# Patient Record
Sex: FEMALE
[De-identification: names, ages, dates, MRNs, and addresses within clinical notes are randomized per-mention and may not be internally consistent; named-entity substitution may affect disease eponyms.]

---

## 2018-04-16 ENCOUNTER — RX ONLY (OUTPATIENT)
Age: 48
Setting detail: RX ONLY
End: 2018-04-16

## 2018-04-16 RX ORDER — VALACYCLOVIR HYDROCHLORIDE 500 MG/1
TABLET, FILM COATED ORAL
Qty: 30 | Refills: 2 | Status: ERX | COMMUNITY
Start: 2018-04-16

## 2019-08-26 ENCOUNTER — RX ONLY (OUTPATIENT)
Age: 49
Setting detail: RX ONLY
End: 2019-08-26

## 2019-08-26 RX ORDER — CIPROFLOXACIN HYDROCHLORIDE 500 MG/1
TABLET, FILM COATED ORAL
Qty: 14 | Refills: 0

## 2019-08-26 RX ORDER — CIPROFLOXACIN HYDROCHLORIDE 500 MG/1
TABLET, FILM COATED ORAL
Qty: 14 | Refills: 0 | Status: ERX | COMMUNITY
Start: 2019-08-26

## 2019-08-26 RX ORDER — CIPROFLOXACIN HYDROCHLORIDE 500 MG/1
TABLET, FILM COATED ORAL
Qty: 14 | Refills: 0 | Status: ERX

## 2020-06-09 ENCOUNTER — NEW PATIENT EMERGENCY (OUTPATIENT)
Dept: URBAN - METROPOLITAN AREA CLINIC 39 | Facility: CLINIC | Age: 50
End: 2020-06-09

## 2020-06-09 DIAGNOSIS — H10.32: ICD-10-CM

## 2020-06-09 PROCEDURE — 99203 OFFICE O/P NEW LOW 30 MIN: CPT

## 2020-06-09 ASSESSMENT — VISUAL ACUITY
OD_SC: 20/400
OD_PH: 20/70
OS_SC: 20/400

## 2020-06-09 ASSESSMENT — TONOMETRY: OD_IOP_MMHG: 16

## 2020-12-03 ENCOUNTER — RX ONLY (OUTPATIENT)
Age: 50
Setting detail: RX ONLY
End: 2020-12-03

## 2020-12-03 RX ORDER — LISINOPRIL 40 MG/1
TABLET ORAL QD
Qty: 30 | Refills: 11 | Status: ERX

## 2020-12-03 RX ORDER — LISINOPRIL 40 MG/1
1 TABLET ORAL QD
Qty: 30 | Refills: 11 | Status: ERX | COMMUNITY
Start: 2020-12-03

## 2020-12-10 ENCOUNTER — RX ONLY (OUTPATIENT)
Age: 50
Setting detail: RX ONLY
End: 2020-12-10

## 2020-12-10 RX ORDER — SPIRONOLACTONE 50 MG/1
50 MG TABLET, FILM COATED ORAL QD
Qty: 30 | Refills: 0 | Status: ERX | COMMUNITY
Start: 2020-12-10

## 2020-12-17 ENCOUNTER — RX ONLY (OUTPATIENT)
Age: 50
Setting detail: RX ONLY
End: 2020-12-17

## 2020-12-17 RX ORDER — AZITHROMYCIN MONOHYDRATE 250 MG/1
1 TABLET, FILM COATED ORAL AS DIRECTED
Qty: 7 | Refills: 0 | Status: ERX | COMMUNITY
Start: 2020-12-17

## 2021-02-09 ENCOUNTER — RX ONLY (OUTPATIENT)
Age: 51
Setting detail: RX ONLY
End: 2021-02-09

## 2021-02-09 RX ORDER — FLUOROURACIL 5 MG/G
1 CREAM TOPICAL BID
Qty: 1 | Refills: 3 | Status: ERX | COMMUNITY
Start: 2021-02-09

## 2021-02-25 ENCOUNTER — RX ONLY (OUTPATIENT)
Age: 51
Setting detail: RX ONLY
End: 2021-02-25

## 2021-02-25 RX ORDER — BUPROPION HYDROCHLORIDE 150 MG/1
TAKE TABLET, FILM COATED, EXTENDED RELEASE ORAL QD
Qty: 30 | Refills: 6 | Status: ERX | COMMUNITY
Start: 2021-02-25

## 2021-05-10 ENCOUNTER — RX ONLY (OUTPATIENT)
Age: 51
Setting detail: RX ONLY
End: 2021-05-10

## 2021-05-10 RX ORDER — HYDROXYZINE HYDROCHLORIDE 50 MG/1
TAKE TABLET, FILM COATED ORAL TID
Qty: 90 | Refills: 6 | Status: ERX | COMMUNITY
Start: 2021-05-10

## 2021-07-26 ENCOUNTER — RX ONLY (OUTPATIENT)
Age: 51
Setting detail: RX ONLY
End: 2021-07-26

## 2021-07-26 RX ORDER — BUPROPION HYDROCHLORIDE 150 MG/1
TABLET, FILM COATED, EXTENDED RELEASE ORAL QD
Qty: 30 | Refills: 6 | Status: CANCELLED

## 2021-09-21 ENCOUNTER — APPOINTMENT (RX ONLY)
Dept: URBAN - METROPOLITAN AREA CLINIC 48 | Facility: CLINIC | Age: 51
Setting detail: DERMATOLOGY
End: 2021-09-21

## 2022-02-24 ENCOUNTER — RX ONLY (OUTPATIENT)
Age: 52
Setting detail: RX ONLY
End: 2022-02-24

## 2022-02-24 RX ORDER — LISINOPRIL 40 MG/1
TABLET ORAL QD
Qty: 30 | Refills: 3 | Status: ERX | COMMUNITY
Start: 2022-02-24

## 2022-06-03 ENCOUNTER — RX ONLY (OUTPATIENT)
Age: 52
Setting detail: RX ONLY
End: 2022-06-03

## 2022-06-03 RX ORDER — LISINOPRIL 40 MG/1
TAKE TABLET ORAL QD
Qty: 90 | Refills: 3 | Status: ERX

## 2022-12-02 ENCOUNTER — RX ONLY (OUTPATIENT)
Age: 52
Setting detail: RX ONLY
End: 2022-12-02

## 2022-12-02 RX ORDER — BUPROPION HYDROCHLORIDE 150 MG/1
TAKE TABLET, EXTENDED RELEASE ORAL QD
Qty: 90 | Refills: 3 | Status: ERX | COMMUNITY
Start: 2022-12-02

## 2023-01-24 ENCOUNTER — APPOINTMENT (RX ONLY)
Dept: RURAL CLINIC 4 | Facility: CLINIC | Age: 53
Setting detail: DERMATOLOGY
End: 2023-01-24

## 2023-01-24 DIAGNOSIS — Z01.818 ENCOUNTER FOR OTHER PREPROCEDURAL EXAMINATION: ICD-10-CM

## 2023-01-24 PROCEDURE — ? COUNSELING

## 2023-01-27 ENCOUNTER — APPOINTMENT (RX ONLY)
Dept: URBAN - METROPOLITAN AREA CLINIC 135 | Facility: CLINIC | Age: 53
Setting detail: DERMATOLOGY
End: 2023-01-27

## 2023-01-27 ENCOUNTER — APPOINTMENT (RX ONLY)
Dept: URBAN - METROPOLITAN AREA CLINIC 132 | Facility: CLINIC | Age: 53
Setting detail: DERMATOLOGY
End: 2023-01-27

## 2023-01-27 ENCOUNTER — APPOINTMENT (RX ONLY)
Dept: RURAL CLINIC 4 | Facility: CLINIC | Age: 53
Setting detail: DERMATOLOGY
End: 2023-01-27

## 2023-01-27 DIAGNOSIS — Z01.818 ENCOUNTER FOR OTHER PREPROCEDURAL EXAMINATION: ICD-10-CM

## 2023-01-27 PROCEDURE — ? COUNSELING

## 2023-05-26 ENCOUNTER — RX ONLY (OUTPATIENT)
Age: 53
Setting detail: RX ONLY
End: 2023-05-26

## 2023-05-26 RX ORDER — LISINOPRIL 40 MG/1
TAKE TABLET ORAL BID
Qty: 60 | Refills: 6 | Status: ERX

## 2023-05-30 ENCOUNTER — APPOINTMENT (RX ONLY)
Dept: RURAL CLINIC 4 | Facility: CLINIC | Age: 53
Setting detail: DERMATOLOGY
End: 2023-05-30

## 2023-05-30 DIAGNOSIS — D18.0 HEMANGIOMA: ICD-10-CM

## 2023-05-30 DIAGNOSIS — L57.8 OTHER SKIN CHANGES DUE TO CHRONIC EXPOSURE TO NONIONIZING RADIATION: ICD-10-CM

## 2023-05-30 DIAGNOSIS — L85.3 XEROSIS CUTIS: ICD-10-CM

## 2023-05-30 DIAGNOSIS — L82.1 OTHER SEBORRHEIC KERATOSIS: ICD-10-CM

## 2023-05-30 DIAGNOSIS — D22 MELANOCYTIC NEVI: ICD-10-CM

## 2023-05-30 DIAGNOSIS — L81.4 OTHER MELANIN HYPERPIGMENTATION: ICD-10-CM

## 2023-05-30 PROCEDURE — ? TREATMENT REGIMEN

## 2023-05-30 PROCEDURE — ? PATIENT SPECIFIC COUNSELING

## 2023-05-30 PROCEDURE — ? COUNSELING

## 2023-05-30 ASSESSMENT — LOCATION DETAILED DESCRIPTION DERM
LOCATION DETAILED: RIGHT SUPERIOR UPPER BACK
LOCATION DETAILED: LEFT CENTRAL MALAR CHEEK
LOCATION DETAILED: LEFT INFERIOR UPPER BACK
LOCATION DETAILED: LEFT MID-UPPER BACK
LOCATION DETAILED: RIGHT INFERIOR LATERAL LOWER BACK
LOCATION DETAILED: LEFT LATERAL ABDOMEN
LOCATION DETAILED: LEFT MEDIAL SUPERIOR CHEST

## 2023-05-30 ASSESSMENT — LOCATION SIMPLE DESCRIPTION DERM
LOCATION SIMPLE: RIGHT UPPER BACK
LOCATION SIMPLE: ABDOMEN
LOCATION SIMPLE: LEFT UPPER BACK
LOCATION SIMPLE: LEFT CHEEK
LOCATION SIMPLE: CHEST
LOCATION SIMPLE: RIGHT LOWER BACK

## 2023-05-30 ASSESSMENT — LOCATION ZONE DERM
LOCATION ZONE: TRUNK
LOCATION ZONE: FACE

## 2023-06-21 ENCOUNTER — APPOINTMENT (RX ONLY)
Dept: RURAL CLINIC 4 | Facility: CLINIC | Age: 53
Setting detail: DERMATOLOGY
End: 2023-06-21

## 2023-06-30 ENCOUNTER — APPOINTMENT (RX ONLY)
Dept: URBAN - METROPOLITAN AREA CLINIC 135 | Facility: CLINIC | Age: 53
Setting detail: DERMATOLOGY
End: 2023-06-30

## 2023-06-30 DIAGNOSIS — Z85.828 PERSONAL HISTORY OF OTHER MALIGNANT NEOPLASM OF SKIN: ICD-10-CM

## 2023-06-30 PROCEDURE — ? COUNSELING

## 2023-07-03 ENCOUNTER — APPOINTMENT (RX ONLY)
Dept: RURAL CLINIC 4 | Facility: CLINIC | Age: 53
Setting detail: DERMATOLOGY
End: 2023-07-03

## 2023-08-04 ENCOUNTER — APPOINTMENT (RX ONLY)
Dept: RURAL CLINIC 4 | Facility: CLINIC | Age: 53
Setting detail: DERMATOLOGY
End: 2023-08-04

## 2023-10-27 ENCOUNTER — RX ONLY (OUTPATIENT)
Age: 53
Setting detail: RX ONLY
End: 2023-10-27

## 2023-10-27 RX ORDER — LISINOPRIL 40 MG/1
TAKE TABLET ORAL BID
Qty: 180 | Refills: 4 | Status: ERX

## 2023-11-16 ENCOUNTER — RX ONLY (OUTPATIENT)
Age: 53
Setting detail: RX ONLY
End: 2023-11-16

## 2023-11-16 RX ORDER — BUPROPION HYDROCHLORIDE 150 MG/1
TAKE TABLET, EXTENDED RELEASE ORAL QD
Qty: 90 | Refills: 3 | Status: ERX

## 2023-11-16 RX ORDER — BUPROPION HYDROCHLORIDE 150 MG/1
TAKE TABLET, EXTENDED RELEASE ORAL QD
Qty: 90 | Refills: 3 | Status: CANCELLED

## 2024-02-06 ENCOUNTER — APPOINTMENT (RX ONLY)
Dept: RURAL CLINIC 4 | Facility: CLINIC | Age: 54
Setting detail: DERMATOLOGY
End: 2024-02-06

## 2024-02-06 DIAGNOSIS — D22 MELANOCYTIC NEVI: ICD-10-CM

## 2024-02-06 PROCEDURE — ? PUNCH EXCISION

## 2024-02-06 ASSESSMENT — LOCATION SIMPLE DESCRIPTION DERM: LOCATION SIMPLE: LEFT THIGH

## 2024-02-06 ASSESSMENT — LOCATION ZONE DERM: LOCATION ZONE: LEG

## 2024-02-06 ASSESSMENT — LOCATION DETAILED DESCRIPTION DERM: LOCATION DETAILED: LEFT ANTERIOR PROXIMAL THIGH

## 2024-02-06 NOTE — PROCEDURE: PUNCH EXCISION

## 2024-03-21 ENCOUNTER — RX ONLY (OUTPATIENT)
Age: 54
Setting detail: RX ONLY
End: 2024-03-21

## 2024-03-21 RX ORDER — BUPROPION HYDROCHLORIDE 150 MG/1
TAKE TABLET, EXTENDED RELEASE ORAL QD
Qty: 30 | Refills: 6 | Status: ERX

## 2024-04-23 ENCOUNTER — RX ONLY (OUTPATIENT)
Age: 54
Setting detail: RX ONLY
End: 2024-04-23

## 2024-04-23 RX ORDER — EMOLLIENT COMBINATION NO.32
EMULSION, EXTENDED RELEASE TOPICAL
Qty: 90 | Refills: 0 | Status: CANCELLED

## 2024-05-24 ENCOUNTER — RX ONLY (OUTPATIENT)
Age: 54
Setting detail: RX ONLY
End: 2024-05-24

## 2024-05-24 RX ORDER — BUPROPION HYDROCHLORIDE 150 MG/1
TAKE TABLET, EXTENDED RELEASE ORAL QD
Qty: 90 | Refills: 3 | Status: ERX

## 2024-07-03 ENCOUNTER — APPOINTMENT (RX ONLY)
Dept: RURAL CLINIC 4 | Facility: CLINIC | Age: 54
Setting detail: DERMATOLOGY
End: 2024-07-03

## 2024-07-03 DIAGNOSIS — D22 MELANOCYTIC NEVI: ICD-10-CM

## 2024-07-03 PROCEDURE — ? EXCISION

## 2024-07-03 ASSESSMENT — LOCATION SIMPLE DESCRIPTION DERM: LOCATION SIMPLE: LEFT UPPER BACK

## 2024-07-03 ASSESSMENT — LOCATION DETAILED DESCRIPTION DERM: LOCATION DETAILED: LEFT MID-UPPER BACK

## 2024-07-03 ASSESSMENT — LOCATION ZONE DERM: LOCATION ZONE: TRUNK

## 2024-07-03 NOTE — PROCEDURE: EXCISION
